# Patient Record
Sex: FEMALE | Race: WHITE
[De-identification: names, ages, dates, MRNs, and addresses within clinical notes are randomized per-mention and may not be internally consistent; named-entity substitution may affect disease eponyms.]

---

## 2023-03-29 PROBLEM — Z00.00 ENCOUNTER FOR PREVENTIVE HEALTH EXAMINATION: Status: ACTIVE | Noted: 2023-03-29

## 2023-03-30 ENCOUNTER — APPOINTMENT (OUTPATIENT)
Dept: PEDIATRIC ALLERGY IMMUNOLOGY | Facility: CLINIC | Age: 63
End: 2023-03-30
Payer: MEDICAID

## 2023-03-30 VITALS
BODY MASS INDEX: 24.45 KG/M2 | SYSTOLIC BLOOD PRESSURE: 110 MMHG | HEIGHT: 63 IN | DIASTOLIC BLOOD PRESSURE: 70 MMHG | WEIGHT: 138 LBS

## 2023-03-30 DIAGNOSIS — J30.89 OTHER ALLERGIC RHINITIS: ICD-10-CM

## 2023-03-30 PROCEDURE — 99203 OFFICE O/P NEW LOW 30 MIN: CPT

## 2023-03-30 NOTE — REVIEW OF SYSTEMS
[Nasal Congestion] : nasal congestion [Snoring] : snoring [Headache] : headache [Nl] : Genitourinary

## 2023-03-30 NOTE — ASSESSMENT
[FreeTextEntry1] : The patient is a 62 year female with a history suggestive of allergic rhinitis and chronic sinusitis. In view of Her recent use of Tylenol sinus I have ordered screening ImmunoCAP. I have emphasized the importance of her seeing the pulmonologist for evaluation of her asthma/COPD status as she is a smoker for 40 plus years. Meanwhile I have instructed her to take Xyzal 5 mg 1 tablet at bedtime and Nasacort nose spray 2 sprays each nostril once a day and will see her in two weeks to assess.

## 2023-03-30 NOTE — SOCIAL HISTORY
[House] : [unfilled] lives in a house  [Radiator/Baseboard] : heating provided by radiator(s)/baseboard(s) [Window Units] : air conditioning provided by window units [Dry] : dry [Bedroom] :  in bedroom [Dog] : dog [] :  [Smokers in Household] : there are smokers in the home [Humidifier] : does not use a humidifier [Dehumidifier] : does not use a dehumidifier [Cockroaches] : Patient states that there are no cockroaches in the home [Dust Mite Covers] : does not have dust mite covers [Feather Pillows] : does not have feather pillows [Feather Comforter] : does not have a feather comforter [Basement] : not in the basement [Living Area] : not in the living area [de-identified] : self and son

## 2023-03-30 NOTE — HISTORY OF PRESENT ILLNESS
[None] : The patient is currently asymptomatic [de-identified] : TYE OCHOA is a 62 year  old female with a history of sinus pressure and congestion on and off for the past 5 years. She states she has to go see her PCP who puts her on antibiotics at least once to twice a year for this and she improves. She doesn't have any other nasal symptoms in the spring or fall.  These sinus episodes are random and at least twice a year. She has used a [place] Pot and other nasal irrigations as well as OTC medications for sinus problems not so regularly all which give minimal relief. She has been a smoker since the age of 14  at least one pack a day. She also states she has been an asthmatic for the past 10 years and is in the beginning stages of COPD according to her PCP. She is currently being followed by her PCP. She has seen a pulmonologist once. She is on various inhalers as listed in medication record and Montelukast 10 mg daily. She does admit that over the course of the last four months since her 's passing she does not take her medications regularly. Since December she has constant sinus pressure which did not improve with antibiotics as she has in the past. She is here to see if allergies are the cause of the sinus issues.

## 2023-04-11 ENCOUNTER — APPOINTMENT (OUTPATIENT)
Dept: PEDIATRIC ALLERGY IMMUNOLOGY | Facility: CLINIC | Age: 63
End: 2023-04-11
Payer: MEDICAID

## 2023-04-11 VITALS — BODY MASS INDEX: 24.45 KG/M2 | HEIGHT: 63 IN | WEIGHT: 138 LBS

## 2023-04-11 DIAGNOSIS — J32.9 CHRONIC SINUSITIS, UNSPECIFIED: ICD-10-CM

## 2023-04-11 PROCEDURE — 99213 OFFICE O/P EST LOW 20 MIN: CPT

## 2023-04-11 NOTE — ASSESSMENT
[FreeTextEntry1] : The patient is a 62 year female with a history suggestive of allergic rhinitis and chronic sinusitis. ImmunoCAP revealed positive reactions to Cat, Dog and Dust mites. I have suggested and ENT evaluation as well as following up with her pulmonologist. Meanwhile I have instructed her to take Xyzal 5 mg 1 tablet at bedtime and Nasacort nose spray 2 sprays each nostril once a day. I will see her as needed.\par

## 2023-04-11 NOTE — HISTORY OF PRESENT ILLNESS
[None] : The patient is currently asymptomatic [de-identified] : TYE OCHOA is a 62 year  old female with a history of sinus pressure and congestion on and off for the past 5 years. She states she has to go see her PCP who puts her on antibiotics at least once to twice a year for this and she improves. She doesn't have any other nasal symptoms in the spring or fall.  These sinus episodes are random and at least twice a year. She has used a [place] Pot and other nasal irrigations as well as OTC medications for sinus problems not so regularly all which give minimal relief. She has been a smoker since the age of 14  at least one pack a day. She also states she has been an asthmatic for the past 10 years and is in the beginning stages of COPD according to her PCP. She is currently being followed by her PCP. She has seen a pulmonologist once. She is on various inhalers as listed in medication record and Montelukast 10 mg daily. She does admit that over the course of the last four months since her 's passing she does not take her medications regularly. Since December she has constant sinus pressure which did not improve with antibiotics as she has in the past. She is here to see if allergies are the cause of the sinus issues.

## 2023-04-11 NOTE — PHYSICAL EXAM
[Alert] : alert [Well Nourished] : well nourished [Healthy Appearance] : healthy appearance [No Acute Distress] : no acute distress [Well Developed] : well developed [Normal Pupil & Iris Size/Symmetry] : normal pupil and iris size and symmetry [No Discharge] : no discharge [No Photophobia] : no photophobia [Sclera Not Icteric] : sclera not icteric [Normal TMs] : both tympanic membranes were normal [Normal Nasal Mucosa] : the nasal mucosa was normal [Normal Lips/Tongue] : the lips and tongue were normal [Normal Outer Ear/Nose] : the ears and nose were normal in appearance [Normal Tonsils] : normal tonsils [No Thrush] : no thrush [Pale mucosa] : pale mucosa [Supple] : the neck was supple [Normal Rate and Effort] : normal respiratory rhythm and effort [No Crackles] : no crackles [No Retractions] : no retractions [Bilateral Audible Breath Sounds] : bilateral audible breath sounds [Normal Rate] : heart rate was normal  [Normal S1, S2] : normal S1 and S2 [No murmur] : no murmur [Regular Rhythm] : with a regular rhythm [Soft] : abdomen soft [Not Tender] : non-tender [No HSM] : no hepato-splenomegaly [Not Distended] : not distended [Normal Cervical Lymph Nodes] : cervical [Skin Intact] : skin intact  [No Rash] : no rash [No Skin Lesions] : no skin lesions [No clubbing] : no clubbing [No Edema] : no edema [No Cyanosis] : no cyanosis [Normal Mood] : mood was normal [Normal Affect] : affect was normal [Alert, Awake, Oriented as Age-Appropriate] : alert, awake, oriented as age appropriate